# Patient Record
Sex: FEMALE | Race: WHITE | NOT HISPANIC OR LATINO | Employment: OTHER | URBAN - METROPOLITAN AREA
[De-identification: names, ages, dates, MRNs, and addresses within clinical notes are randomized per-mention and may not be internally consistent; named-entity substitution may affect disease eponyms.]

---

## 2023-01-01 ENCOUNTER — HOSPITAL ENCOUNTER (EMERGENCY)
Facility: HOSPITAL | Age: 83
End: 2023-06-04
Attending: EMERGENCY MEDICINE

## 2023-01-01 VITALS
DIASTOLIC BLOOD PRESSURE: 74 MMHG | SYSTOLIC BLOOD PRESSURE: 116 MMHG | OXYGEN SATURATION: 32 % | TEMPERATURE: 102 F | RESPIRATION RATE: 20 BRPM | WEIGHT: 140 LBS

## 2023-01-01 DIAGNOSIS — I46.9 CARDIAC ARREST: ICD-10-CM

## 2023-01-01 DIAGNOSIS — R41.89 UNRESPONSIVE: ICD-10-CM

## 2023-01-01 DIAGNOSIS — S06.334A: Primary | ICD-10-CM

## 2023-01-01 DIAGNOSIS — I21.3 ST ELEVATION MYOCARDIAL INFARCTION (STEMI), UNSPECIFIED ARTERY: ICD-10-CM

## 2023-01-01 LAB
ALBUMIN SERPL BCP-MCNC: 3.5 G/DL (ref 3.5–5.2)
ALP SERPL-CCNC: 113 U/L (ref 55–135)
ALT SERPL W/O P-5'-P-CCNC: 34 U/L (ref 10–44)
ANION GAP SERPL CALC-SCNC: 18 MMOL/L (ref 8–16)
AST SERPL-CCNC: 157 U/L (ref 10–40)
BACTERIA #/AREA URNS AUTO: ABNORMAL /HPF
BASOPHILS # BLD AUTO: 0.11 K/UL (ref 0–0.2)
BASOPHILS NFR BLD: 0.5 % (ref 0–1.9)
BILIRUB SERPL-MCNC: 1.1 MG/DL (ref 0.1–1)
BILIRUB UR QL STRIP: NEGATIVE
BUN SERPL-MCNC: 20 MG/DL (ref 8–23)
BUN SERPL-MCNC: 22 MG/DL (ref 6–30)
CALCIUM SERPL-MCNC: 9.4 MG/DL (ref 8.7–10.5)
CAOX CRY UR QL COMP ASSIST: ABNORMAL
CHLORIDE SERPL-SCNC: 105 MMOL/L (ref 95–110)
CHLORIDE SERPL-SCNC: 107 MMOL/L (ref 95–110)
CLARITY UR REFRACT.AUTO: ABNORMAL
CO2 SERPL-SCNC: 19 MMOL/L (ref 23–29)
COLOR UR AUTO: ABNORMAL
CREAT SERPL-MCNC: 1.2 MG/DL (ref 0.5–1.4)
CREAT SERPL-MCNC: 1.3 MG/DL (ref 0.5–1.4)
DIFFERENTIAL METHOD: ABNORMAL
EOSINOPHIL # BLD AUTO: 0 K/UL (ref 0–0.5)
EOSINOPHIL NFR BLD: 0 % (ref 0–8)
ERYTHROCYTE [DISTWIDTH] IN BLOOD BY AUTOMATED COUNT: 17.5 % (ref 11.5–14.5)
EST. GFR  (NO RACE VARIABLE): 40.3 ML/MIN/1.73 M^2
GLUCOSE SERPL-MCNC: 154 MG/DL (ref 70–110)
GLUCOSE SERPL-MCNC: 156 MG/DL (ref 70–110)
GLUCOSE UR QL STRIP: ABNORMAL
HCT VFR BLD AUTO: 52.5 % (ref 37–48.5)
HCT VFR BLD CALC: 55 %PCV (ref 36–54)
HGB BLD-MCNC: 16.8 G/DL (ref 12–16)
HGB UR QL STRIP: ABNORMAL
HYALINE CASTS UR QL AUTO: 7 /LPF
IMM GRANULOCYTES # BLD AUTO: 0.13 K/UL (ref 0–0.04)
IMM GRANULOCYTES NFR BLD AUTO: 0.6 % (ref 0–0.5)
KETONES UR QL STRIP: ABNORMAL
LACTATE SERPL-SCNC: 5 MMOL/L (ref 0.5–2.2)
LEUKOCYTE ESTERASE UR QL STRIP: NEGATIVE
LYMPHOCYTES # BLD AUTO: 1 K/UL (ref 1–4.8)
LYMPHOCYTES NFR BLD: 4.9 % (ref 18–48)
MAGNESIUM SERPL-MCNC: 2.2 MG/DL (ref 1.6–2.6)
MCH RBC QN AUTO: 33.4 PG (ref 27–31)
MCHC RBC AUTO-ENTMCNC: 32 G/DL (ref 32–36)
MCV RBC AUTO: 104 FL (ref 82–98)
MICROSCOPIC COMMENT: ABNORMAL
MONOCYTES # BLD AUTO: 1.1 K/UL (ref 0.3–1)
MONOCYTES NFR BLD: 5.2 % (ref 4–15)
NEUTROPHILS # BLD AUTO: 18.5 K/UL (ref 1.8–7.7)
NEUTROPHILS NFR BLD: 88.8 % (ref 38–73)
NITRITE UR QL STRIP: NEGATIVE
NRBC BLD-RTO: 0 /100 WBC
PH UR STRIP: 5 [PH] (ref 5–8)
PLATELET # BLD AUTO: 510 K/UL (ref 150–450)
PMV BLD AUTO: 9.2 FL (ref 9.2–12.9)
POC IONIZED CALCIUM: 0.98 MMOL/L (ref 1.06–1.42)
POC TCO2 (MEASURED): 22 MMOL/L (ref 23–29)
POTASSIUM BLD-SCNC: 4.5 MMOL/L (ref 3.5–5.1)
POTASSIUM SERPL-SCNC: 5 MMOL/L (ref 3.5–5.1)
PROT SERPL-MCNC: 7.4 G/DL (ref 6–8.4)
PROT UR QL STRIP: ABNORMAL
RBC # BLD AUTO: 5.03 M/UL (ref 4–5.4)
RBC #/AREA URNS AUTO: 7 /HPF (ref 0–4)
SAMPLE: ABNORMAL
SODIUM BLD-SCNC: 141 MMOL/L (ref 136–145)
SODIUM SERPL-SCNC: 142 MMOL/L (ref 136–145)
SP GR UR STRIP: 1.02 (ref 1–1.03)
SQUAMOUS #/AREA URNS AUTO: 2 /HPF
TROPONIN I SERPL DL<=0.01 NG/ML-MCNC: >50 NG/ML (ref 0–0.03)
URN SPEC COLLECT METH UR: ABNORMAL
WBC # BLD AUTO: 20.8 K/UL (ref 3.9–12.7)
WBC #/AREA URNS AUTO: 12 /HPF (ref 0–5)

## 2023-01-01 PROCEDURE — 96374 THER/PROPH/DIAG INJ IV PUSH: CPT

## 2023-01-01 PROCEDURE — 87040 BLOOD CULTURE FOR BACTERIA: CPT | Mod: 59 | Performed by: STUDENT IN AN ORGANIZED HEALTH CARE EDUCATION/TRAINING PROGRAM

## 2023-01-01 PROCEDURE — 99285 EMERGENCY DEPT VISIT HI MDM: CPT | Mod: ,,, | Performed by: NURSE PRACTITIONER

## 2023-01-01 PROCEDURE — 96376 TX/PRO/DX INJ SAME DRUG ADON: CPT

## 2023-01-01 PROCEDURE — 99291 CRITICAL CARE FIRST HOUR: CPT | Mod: ,,, | Performed by: EMERGENCY MEDICINE

## 2023-01-01 PROCEDURE — 63600175 PHARM REV CODE 636 W HCPCS

## 2023-01-01 PROCEDURE — 27200966 HC CLOSED SUCTION SYSTEM

## 2023-01-01 PROCEDURE — 99900035 HC TECH TIME PER 15 MIN (STAT)

## 2023-01-01 PROCEDURE — 99285 EMERGENCY DEPT VISIT HI MDM: CPT | Mod: 25

## 2023-01-01 PROCEDURE — 87086 URINE CULTURE/COLONY COUNT: CPT | Performed by: STUDENT IN AN ORGANIZED HEALTH CARE EDUCATION/TRAINING PROGRAM

## 2023-01-01 PROCEDURE — 85025 COMPLETE CBC W/AUTO DIFF WBC: CPT | Performed by: STUDENT IN AN ORGANIZED HEALTH CARE EDUCATION/TRAINING PROGRAM

## 2023-01-01 PROCEDURE — 83605 ASSAY OF LACTIC ACID: CPT | Performed by: STUDENT IN AN ORGANIZED HEALTH CARE EDUCATION/TRAINING PROGRAM

## 2023-01-01 PROCEDURE — 94002 VENT MGMT INPAT INIT DAY: CPT

## 2023-01-01 PROCEDURE — 63600175 PHARM REV CODE 636 W HCPCS: Performed by: EMERGENCY MEDICINE

## 2023-01-01 PROCEDURE — 84484 ASSAY OF TROPONIN QUANT: CPT | Performed by: STUDENT IN AN ORGANIZED HEALTH CARE EDUCATION/TRAINING PROGRAM

## 2023-01-01 PROCEDURE — 63600175 PHARM REV CODE 636 W HCPCS: Performed by: STUDENT IN AN ORGANIZED HEALTH CARE EDUCATION/TRAINING PROGRAM

## 2023-01-01 PROCEDURE — 94761 N-INVAS EAR/PLS OXIMETRY MLT: CPT

## 2023-01-01 PROCEDURE — 99291 PR CRITICAL CARE, E/M 30-74 MINUTES: ICD-10-PCS | Mod: ,,, | Performed by: EMERGENCY MEDICINE

## 2023-01-01 PROCEDURE — 27000221 HC OXYGEN, UP TO 24 HOURS

## 2023-01-01 PROCEDURE — 96375 TX/PRO/DX INJ NEW DRUG ADDON: CPT

## 2023-01-01 PROCEDURE — 83735 ASSAY OF MAGNESIUM: CPT | Performed by: STUDENT IN AN ORGANIZED HEALTH CARE EDUCATION/TRAINING PROGRAM

## 2023-01-01 PROCEDURE — 81001 URINALYSIS AUTO W/SCOPE: CPT | Performed by: STUDENT IN AN ORGANIZED HEALTH CARE EDUCATION/TRAINING PROGRAM

## 2023-01-01 PROCEDURE — 99900026 HC AIRWAY MAINTENANCE (STAT)

## 2023-01-01 PROCEDURE — 99285 PR EMERGENCY DEPT VISIT,LEVEL V: ICD-10-PCS | Mod: ,,, | Performed by: NURSE PRACTITIONER

## 2023-01-01 PROCEDURE — 80053 COMPREHEN METABOLIC PANEL: CPT | Performed by: STUDENT IN AN ORGANIZED HEALTH CARE EDUCATION/TRAINING PROGRAM

## 2023-01-01 RX ORDER — SERTRALINE HYDROCHLORIDE 50 MG/1
50 TABLET, FILM COATED ORAL DAILY
COMMUNITY

## 2023-01-01 RX ORDER — MORPHINE SULFATE 4 MG/ML
4 INJECTION, SOLUTION INTRAMUSCULAR; INTRAVENOUS
Status: DISCONTINUED | OUTPATIENT
Start: 2023-01-01 | End: 2023-06-05 | Stop reason: HOSPADM

## 2023-01-01 RX ORDER — SIMVASTATIN 40 MG/1
40 TABLET, FILM COATED ORAL NIGHTLY
COMMUNITY

## 2023-01-01 RX ORDER — DONEPEZIL HYDROCHLORIDE 10 MG/1
10 TABLET, FILM COATED ORAL NIGHTLY
COMMUNITY

## 2023-01-01 RX ORDER — LORAZEPAM 2 MG/ML
0.5 INJECTION INTRAMUSCULAR EVERY 30 MIN PRN
Status: DISCONTINUED | OUTPATIENT
Start: 2023-01-01 | End: 2023-06-05 | Stop reason: HOSPADM

## 2023-01-01 RX ORDER — MORPHINE SULFATE 4 MG/ML
INJECTION, SOLUTION INTRAMUSCULAR; INTRAVENOUS
Status: COMPLETED
Start: 2023-01-01 | End: 2023-01-01

## 2023-01-01 RX ORDER — ACETAMINOPHEN 650 MG/1
650 SUPPOSITORY RECTAL
Status: DISCONTINUED | OUTPATIENT
Start: 2023-01-01 | End: 2023-01-01

## 2023-01-01 RX ORDER — FAMOTIDINE 20 MG/1
20 TABLET, FILM COATED ORAL 2 TIMES DAILY
COMMUNITY

## 2023-01-01 RX ORDER — ASPIRIN 81 MG/1
81 TABLET ORAL DAILY
COMMUNITY

## 2023-01-01 RX ORDER — PANTOPRAZOLE SODIUM 40 MG/1
40 TABLET, DELAYED RELEASE ORAL DAILY
COMMUNITY

## 2023-01-01 RX ORDER — LORAZEPAM 2 MG/ML
INJECTION INTRAMUSCULAR
Status: COMPLETED
Start: 2023-01-01 | End: 2023-01-01

## 2023-01-01 RX ORDER — HYDROXYUREA 500 MG/1
500 CAPSULE ORAL DAILY
COMMUNITY

## 2023-01-01 RX ORDER — LOSARTAN POTASSIUM 25 MG/1
25 TABLET ORAL DAILY
COMMUNITY

## 2023-01-01 RX ORDER — ALLOPURINOL 100 MG/1
100 TABLET ORAL DAILY
COMMUNITY

## 2023-01-01 RX ORDER — TRAZODONE HYDROCHLORIDE 50 MG/1
50 TABLET ORAL NIGHTLY
COMMUNITY

## 2023-01-01 RX ORDER — CETIRIZINE HYDROCHLORIDE 10 MG/1
10 TABLET ORAL DAILY
COMMUNITY

## 2023-01-01 RX ORDER — LORAZEPAM 2 MG/ML
1 INJECTION INTRAMUSCULAR
Status: COMPLETED | OUTPATIENT
Start: 2023-01-01 | End: 2023-01-01

## 2023-01-01 RX ORDER — DOXYCYCLINE 100 MG/1
100 CAPSULE ORAL EVERY 12 HOURS
COMMUNITY

## 2023-01-01 RX ADMIN — LORAZEPAM 0.5 MG: 2 INJECTION INTRAMUSCULAR; INTRAVENOUS at 03:06

## 2023-01-01 RX ADMIN — MORPHINE SULFATE 4 MG: 4 INJECTION INTRAVENOUS at 03:06

## 2023-01-01 RX ADMIN — LORAZEPAM 0.5 MG: 2 INJECTION INTRAMUSCULAR; INTRAVENOUS at 01:06

## 2023-01-01 RX ADMIN — MORPHINE SULFATE 4 MG: 4 INJECTION INTRAVENOUS at 01:06

## 2023-01-01 RX ADMIN — LORAZEPAM 1 MG: 2 INJECTION INTRAMUSCULAR; INTRAVENOUS at 03:06

## 2023-06-04 PROBLEM — S06.33AA INTRAPARENCHYMAL HEMATOMA OF BRAIN: Status: ACTIVE | Noted: 2023-01-01

## 2023-06-04 NOTE — CONSULTS
[facility] - Emergency Dept  Neurocritical Care  Consult Note    Admit Date: 6/4/2023  Service Date: 06/04/2023  Length of Stay: 0    Consults  Subjective:     Chief Complaint: <principal problem not specified>    History of Present Illness: No notes on file    Past Medical History:   Diagnosis Date    Cholelithiasis     Chronic pain     Cognitive decline     Depression     Diverticulitis     Fibromyalgia     Hypertension     Memory deficit     Mixed hyperlipidemia     Thrombocytopenia, unspecified     Urinary tract infection, site not specified      No past surgical history on file.   No current facility-administered medications on file prior to encounter.     Current Outpatient Medications on File Prior to Encounter   Medication Sig Dispense Refill    allopurinoL (ZYLOPRIM) 100 MG tablet Take 100 mg by mouth once daily.      aspirin (ECOTRIN) 81 MG EC tablet Take 81 mg by mouth once daily.      cetirizine (ZYRTEC) 10 MG tablet Take 10 mg by mouth once daily.      donepeziL (ARICEPT) 10 MG tablet Take 10 mg by mouth every evening.      doxycycline (VIBRAMYCIN) 100 MG Cap Take 100 mg by mouth every 12 (twelve) hours.      famotidine (PEPCID) 20 MG tablet Take 20 mg by mouth 2 (two) times daily.      folic acid/multivit-min/lutein (CENTRUM SILVER ORAL) Take by mouth.      hydroxyurea (HYDREA) 500 mg Cap Take 500 mg by mouth once daily.      losartan (COZAAR) 25 MG tablet Take 25 mg by mouth once daily.      pantoprazole (PROTONIX) 40 MG tablet Take 40 mg by mouth once daily.      sertraline (ZOLOFT) 50 MG tablet Take 50 mg by mouth once daily.      simvastatin (ZOCOR) 40 MG tablet Take 40 mg by mouth every evening.      traZODone (DESYREL) 50 MG tablet Take 50 mg by mouth every evening.        Allergies: Ciprofloxacin, Codeine, and Lyrica [pregabalin]    No family history on file.     Review of Systems   Reason unable to perform ROS: decrease LOC.   Objective:     Vitals:    Temp: (!) 102.4 °F  (39.1 °C)  Pulse: (!) 0  BP: 116/74  MAP (mmHg): 90  Resp: 20  SpO2: (!) 32 %  Oxygen Concentration (%): 80  Vent Mode: A/C  Set Rate: 16 BPM  Vt Set: 380 mL  PEEP/CPAP: 5 cmH20  Peak Airway Pressure: 22 cmH20  Mean Airway Pressure: 8.3 cmH20  Plateau Pressure: 0 cmH20    Temp  Min: 98.8 °F (37.1 °C)  Max: 102.4 °F (39.1 °C)  Pulse  Min: 0  Max: 134  BP  Min: 103/65  Max: 128/74  MAP (mmHg)  Min: 81  Max: 95  Resp  Min: 10  Max: 30  SpO2  Min: 32 %  Max: 100 %  Oxygen Concentration (%)  Min: 80  Max: 80    No intake/output data recorded.            Physical Exam  Vitals and nursing note reviewed.   Constitutional:       Appearance: She is ill-appearing.   HENT:      Head: Normocephalic.      Nose: Nose normal.      Mouth/Throat:      Mouth: Mucous membranes are moist.      Pharynx: Oropharynx is clear.   Cardiovascular:      Rate and Rhythm: Normal rate and regular rhythm.      Pulses: Normal pulses.      Heart sounds: Normal heart sounds.   Pulmonary:      Effort: Pulmonary effort is normal.      Breath sounds: Normal breath sounds.   Skin:     General: Skin is warm and dry.      Capillary Refill: Capillary refill takes 2 to 3 seconds.   Neurological:      Comments: GCS T3  Non-reactive pupils  No cough or gag  Extensor posture BL UE  Triple flexion BLE         Unable to test orientation, language, memory, judgment, insight, fund of knowledge, hearing, shoulder shrug, tongue protrusion, coordination, gait due to level of consciousness.       Today I personally reviewed pertinent medications, lines/drains/airways, imaging, cardiology results, laboratory results, microbiology results, notably:          Assessment/Plan:     Neuro  Intraparenchymal hematoma of brain  Large IPH with IVH, NSGY evaluation not surgical interventions offered. Family has decided to transition to comfort care due to not a meaningful recovery.             The patient is being Prophylaxed for:  Venous Thromboembolism with: Not Applicable    Stress Ulcer with: Not Applicable   Ventilator Pneumonia with: not applicable    Activity Orders          None        No Order  Level 3  Tosin Rudolph NP  Neurocritical Care  [facility] - Emergency Dept

## 2023-06-04 NOTE — SUBJECTIVE & OBJECTIVE
Past Medical History:   Diagnosis Date    Cholelithiasis     Chronic pain     Cognitive decline     Depression     Diverticulitis     Fibromyalgia     Hypertension     Memory deficit     Mixed hyperlipidemia     Thrombocytopenia, unspecified     Urinary tract infection, site not specified      No past surgical history on file.   No current facility-administered medications on file prior to encounter.     Current Outpatient Medications on File Prior to Encounter   Medication Sig Dispense Refill    allopurinoL (ZYLOPRIM) 100 MG tablet Take 100 mg by mouth once daily.      aspirin (ECOTRIN) 81 MG EC tablet Take 81 mg by mouth once daily.      cetirizine (ZYRTEC) 10 MG tablet Take 10 mg by mouth once daily.      donepeziL (ARICEPT) 10 MG tablet Take 10 mg by mouth every evening.      doxycycline (VIBRAMYCIN) 100 MG Cap Take 100 mg by mouth every 12 (twelve) hours.      famotidine (PEPCID) 20 MG tablet Take 20 mg by mouth 2 (two) times daily.      folic acid/multivit-min/lutein (CENTRUM SILVER ORAL) Take by mouth.      hydroxyurea (HYDREA) 500 mg Cap Take 500 mg by mouth once daily.      losartan (COZAAR) 25 MG tablet Take 25 mg by mouth once daily.      pantoprazole (PROTONIX) 40 MG tablet Take 40 mg by mouth once daily.      sertraline (ZOLOFT) 50 MG tablet Take 50 mg by mouth once daily.      simvastatin (ZOCOR) 40 MG tablet Take 40 mg by mouth every evening.      traZODone (DESYREL) 50 MG tablet Take 50 mg by mouth every evening.        Allergies: Ciprofloxacin, Codeine, and Lyrica [pregabalin]    No family history on file.     Review of Systems   Reason unable to perform ROS: decrease LOC.   Objective:     Vitals:    Temp: (!) 102.4 °F (39.1 °C)  Pulse: (!) 0  BP: 116/74  MAP (mmHg): 90  Resp: 20  SpO2: (!) 32 %  Oxygen Concentration (%): 80  Vent Mode: A/C  Set Rate: 16 BPM  Vt Set: 380 mL  PEEP/CPAP: 5 cmH20  Peak Airway Pressure: 22 cmH20  Mean Airway Pressure: 8.3 cmH20  Plateau Pressure: 0 cmH20    Temp  Min:  98.8 °F (37.1 °C)  Max: 102.4 °F (39.1 °C)  Pulse  Min: 0  Max: 134  BP  Min: 103/65  Max: 128/74  MAP (mmHg)  Min: 81  Max: 95  Resp  Min: 10  Max: 30  SpO2  Min: 32 %  Max: 100 %  Oxygen Concentration (%)  Min: 80  Max: 80    No intake/output data recorded.            Physical Exam  Vitals and nursing note reviewed.   Constitutional:       Appearance: She is ill-appearing.   HENT:      Head: Normocephalic.      Nose: Nose normal.      Mouth/Throat:      Mouth: Mucous membranes are moist.      Pharynx: Oropharynx is clear.   Cardiovascular:      Rate and Rhythm: Normal rate and regular rhythm.      Pulses: Normal pulses.      Heart sounds: Normal heart sounds.   Pulmonary:      Effort: Pulmonary effort is normal.      Breath sounds: Normal breath sounds.   Skin:     General: Skin is warm and dry.      Capillary Refill: Capillary refill takes 2 to 3 seconds.   Neurological:      Comments: GCS T3  Non-reactive pupils  No cough or gag  Extensor posture BL UE  Triple flexion BLE         Unable to test orientation, language, memory, judgment, insight, fund of knowledge, hearing, shoulder shrug, tongue protrusion, coordination, gait due to level of consciousness.       Today I personally reviewed pertinent medications, lines/drains/airways, imaging, cardiology results, laboratory results, microbiology results, notably:

## 2023-06-04 NOTE — PLAN OF CARE
06/04/23 1234   Post-Acute Status   Post-Acute Authorization Other   Discharge Delays None known at this time   Discharge Plan   Discharge Plan A Other  (await recs from cards, critical care and NSGY)

## 2023-06-04 NOTE — ASSESSMENT & PLAN NOTE
83 F with IPH with IVH 3T on arrival ICH score 5  With grim prognosis and family requesting palliative care.     No surgical  interventions.

## 2023-06-04 NOTE — PROVIDER PROGRESS NOTES - EMERGENCY DEPT.
Encounter Date: 6/4/2023    ED Physician Progress Notes        Physician Note:   3:00 PM  Patient received in sign-out from outgoing team.  Briefly, 83-year-old female transferred from [facility] for septic shock, large intraparenchymal hemorrhage with herniation requiring intubation, STEMI.      Son initially at bedside, patient was seen by all consultants who have agreed that her prognosis is terminal.  Patient has been terminally extubated at 1:45 p.m.    On re-evaluation, patient unresponsive with sonorous breathing.  Pupils are unequal.  Saturation 67%, tachycardic.  Family not present at bedside.  P.r.n. morphine and Ativan have been ordered.     Continue to monitor.     Pt re-evaluated, apneic with no pulse.  Asystole on monitor.  Time of death 4:52 PM  Notified feroz Webb by phone.    SARATH Castillo MD  Staff ED Physician  06/04/2023 4:56 PM

## 2023-06-04 NOTE — ED NOTES
I-STAT Chem-8+ Results:   Value Reference Range   Sodium 141 136-145 mmol/L   Potassium  4.5 3.5-5.1 mmol/L   Chloride 107  mmol/L   Ionized Calcium 0.98 1.06-1.42 mmol/L   CO2 (measured) 22 23-29 mmol/L   Glucose 154  mg/dL   BUN 22 6-30 mg/dL   Creatinine 1.2 0.5-1.4 mg/dL   Hematocrit 55 36-54%

## 2023-06-04 NOTE — ED PROVIDER NOTES
Encounter Date: 6/4/2023       History     Chief Complaint   Patient presents with    [facility] Transfer     Seen at [facility] yesterday post fall and diagnosed with uti; no ct done. Found unresponsive this am. Lnk 10pm. Arrived at [facility] with gcs 5. Given etomidate and pippa for intubation. 7.0 tube @ 22. 14 fr og. 16 fr Treadwell. Flight care started versed drip en route to er. Gave total of 100mcg Fentanyl. + gag. + bilateral eye movement (rhythmic side to side movement). Right pupil larger than left. Versed drip stopped upon arrival to [facility] er.      83-year-old female presenting to the ED as transfer from outside hospital for intraparenchymal hemorrhage.  This is seen at [facility] in [place] for yesterday diagnosed with the UTI.  Was also reported to have multiple falls.  Was seen at [facility] today, and was diagnosed with a new intraparenchymal hemorrhage.  Also seen to have a STEMI.  Patient was intubated.  GCS was five on arrival.  Flight medicine started patient on Versed drip for sedation.  Was transferred to [facility] for Neurosurgery, neuro ICU evaluation, possible TEU.    The history is provided by the EMS personnel.   Review of patient's allergies indicates:   Allergen Reactions    Ciprofloxacin     Codeine     Lyrica [pregabalin]      Past Medical History:   Diagnosis Date    Cholelithiasis     Chronic pain     Cognitive decline     Depression     Diverticulitis     Fibromyalgia     Hypertension     Memory deficit     Mixed hyperlipidemia     Thrombocytopenia, unspecified     Urinary tract infection, site not specified      No past surgical history on file.  No family history on file.     Review of Systems    Physical Exam     Initial Vitals [06/04/23 1017]   BP Pulse Resp Temp SpO2   128/65 102 16 98.8 °F (37.1 °C) 100 %      MAP       --         Physical Exam    Nursing note and vitals reviewed.  Constitutional:   intubated   Eyes:   Unequal pupils    Cardiovascular:            tachycardic   Pulmonary/Chest: She has no wheezes. She has no rales.   Bilateral breath sounds heard   Abdominal: Abdomen is soft.     Neurological:   No purposeful movement in upper lower extremities, no withdrawing from pain  Positive gag, cough       ED Course   Procedures  Labs Reviewed   CBC W/ AUTO DIFFERENTIAL - Abnormal; Notable for the following components:       Result Value    WBC 20.80 (*)     Hemoglobin 16.8 (*)     Hematocrit 52.5 (*)      (*)     MCH 33.4 (*)     RDW 17.5 (*)     Platelets 510 (*)     Immature Granulocytes 0.6 (*)     Gran # (ANC) 18.5 (*)     Immature Grans (Abs) 0.13 (*)     Mono # 1.1 (*)     Gran % 88.8 (*)     Lymph % 4.9 (*)     All other components within normal limits   COMPREHENSIVE METABOLIC PANEL - Abnormal; Notable for the following components:    CO2 19 (*)     Glucose 156 (*)     Total Bilirubin 1.1 (*)      (*)     Anion Gap 18 (*)     eGFR 40.3 (*)     All other components within normal limits   TROPONIN I - Abnormal; Notable for the following components:    Troponin I >50.000 (*)     All other components within normal limits   URINALYSIS, REFLEX TO URINE CULTURE - Abnormal; Notable for the following components:    Appearance, UA Cloudy (*)     Protein, UA 3+ (*)     Glucose, UA 2+ (*)     Ketones, UA Trace (*)     Occult Blood UA 2+ (*)     All other components within normal limits    Narrative:     Specimen Source->Urine   LACTIC ACID, PLASMA - Abnormal; Notable for the following components:    Lactate (Lactic Acid) 5.0 (*)     All other components within normal limits   URINALYSIS MICROSCOPIC - Abnormal; Notable for the following components:    RBC, UA 7 (*)     WBC, UA 12 (*)     Hyaline Casts, UA 7 (*)     All other components within normal limits    Narrative:     Specimen Source->Urine   ISTAT PROCEDURE - Abnormal; Notable for the following components:    POC Glucose 154 (*)     POC TCO2 (MEASURED) 22 (*)     POC  Ionized Calcium 0.98 (*)     POC Hematocrit 55 (*)     All other components within normal limits   CULTURE, BLOOD   CULTURE, BLOOD   CULTURE, URINE   MAGNESIUM     EKG Readings: (Independently Interpreted)   ST-elevation noted in anterior leads.  Concern for STEMI.  Rate of 110.     Imaging Results              CT Head Without Contrast (Final result)  Result time 06/04/23 12:48:15      Final result by Tom Gooden DO (06/04/23 12:48:15)                   Impression:      Large parenchymal hemorrhage centered in the right basal ganglia with intraventricular extension similar to prior report allowing for limitation by lack of prior imaging for comparison.    Mass effect with approximately 1.2 cm of leftward midline shift with subfalcine herniation    Distension of the lateral ventricles left greater than right concerning for evolving hydrocephalus    Clinical correlation and emergent neuro surgical evaluation recommended.  Please see above for additional details.    Further evaluation with MRI as warranted if patient compatible.      Electronically signed by: Tom Gooden DO  Date:    06/04/2023  Time:    12:48               Narrative:    EXAMINATION:  CT HEAD WITHOUT CONTRAST    CLINICAL HISTORY:  Subdural hemorrhage;    TECHNIQUE:  Multiple sequential 5 mm axial images of the head without contrast.  Coronal and sagittal reformatted imaging from the axial acquisition.    COMPARISON:  Outside institution report [facility] earlier today 06/04/2023    FINDINGS:  Large hyperdense collection centered in the right basal ganglia compatible with acute parenchymal hemorrhage.  This measures approximately 7.8 x 5.2 x 5.3 cm in AP by TV by CC dimensions respectively.  There is intraventricular extension of hemorrhage into the right lateral ventricle.  Mass effect with compression of the right lateral ventricle and approximate 1.2 cm of leftward midline shift with subfalcine herniation.    There is  hypoattenuation within the parenchyma about the hemorrhage compatible with edema.  There is hypoattenuation within the right thalamus and questionably hypoattenuation within the right midbrain and questionably diffusely within the demetria.    Scattered intraventricular hemorrhage with distension of the left lateral ventricle and right temporal horn compatible with hydrocephalus    Hypoattenuation within the cerebral white matter concerning for possible superimposed chronic ischemic change    There is partial crowding of the cerebral sulci at the vertex.    Small hypodensity left posterior thalamus most compatible with remote lacunar-type infarction.    COMMUNICATION  This critical result was discovered/received at 12:37.  The critical information above was relayed directly by me by telephone to Dr. Garsia on 06/04/2023 at 12:39.                                       X-Ray Chest AP Portable (Final result)  Result time 06/04/23 11:06:52      Final result by Palomo Smith MD (06/04/23 11:06:52)                   Impression:      As above.      Electronically signed by: Palomo Smith MD  Date:    06/04/2023  Time:    11:06               Narrative:    EXAMINATION:  XR CHEST AP PORTABLE    CLINICAL HISTORY:  Other symptoms and signs involving cognitive functions and awareness    TECHNIQUE:  Single frontal view of the chest was performed.    FINDINGS:  There is an endotracheal tube with its tip at the aortic arch.  There is a nasogastric tube with its tip inferior to the gastroesophageal junction but out of the field of view.  There are bilateral perihilar opacities as well as a left basilar opacity concerning for infection cannot exclude edema.  There is no pneumothorax or pleural fluid.  The cardiac silhouette is not enlarged.  The aorta is tortuous.  The osseous structures demonstrate degenerative change.                                    X-Rays:   Independently Interpreted Readings:   Other Readings:  ET tube in  appropriate position  Medications   LORazepam injection 1 mg (1 mg Intravenous Given 6/4/23 7117)     Medical Decision Making:   History:   Old Medical Records: I decided to obtain old medical records.  Initial Assessment:   Emergent evaluation 83-year-old female presenting to the as a transfer from outside hospital as a NAMRATA candidate for neurosurgical evaluation secondary to large intracranial hemorrhage.  GCS of 4 prior to intubation per notes.    Differential includes was not limited to intracranial hemorrhage, metastasis, mass effect, herniation.    Initial EKG showed concern for STEMI.  Discussed with cardiology resident who recommended no acute intervention due to patient's poor prognosis.  Obtain blood work.  Initially ordered antibiotics and Tylenol due to concern for sepsis, though after family came, patient transitioned to comfort care, allow natural death.  Patient elderly extubated.  Given Ativan and morphine for comfort.  Pending further management, possible transfer to hospice, patient handed off to oncoming team at shift change.  Independently Interpreted Test(s):   I have ordered and independently interpreted X-rays - see prior notes.  I have ordered and independently interpreted EKG Reading(s) - see prior notes  Clinical Tests:   Lab Tests: Ordered and Reviewed  Radiological Study: Ordered and Reviewed  Medical Tests: Ordered and Reviewed  Other:   I have discussed this case with another health care provider.       <> Summary of the Discussion: Neurosurgery, neuro ICU           ED Course as of 06/05/23 1841   Sun Jun 04, 2023   1059 Pt with signs of a STEMI on her EKG with [facility] Pt  with a large intracranial hemorrhage with significant neuro deficits. Is a TEU candidate. I don't think patient is a candidate for a cardiac cath at this time  [GK]   1117 Discussed concern for STEMI with Cardiology.  They said due to patient's mental  status, prognosis, she is not someone that they would take the cath lab at this moment.  They will drop a plan of care note. [AU]   1254 Pt's son Jon is at bedside.  We discussed pt's condition and he reports that patient would want to die a natural death and she was ready to go be with his father.  We will discuss with neurosurgery and neuro ICU. Nurses updated.  [GK]      ED Course User Index  [AU] Marek Jay MD  [GK] Kaleigh Garsia MD                 Clinical Impression:   Final diagnoses:  [R41.89] Unresponsive  [S06.334A] Intraparenchymal hematoma of brain with loss of consciousness of 6 hours to 24 hours, unspecified laterality, initial encounter (Primary)  [I21.3] ST elevation myocardial infarction (STEMI), unspecified artery  [I46.9] Cardiac arrest        ED Disposition Condition                     Marek Jay MD  Resident  [phone]

## 2023-06-04 NOTE — ED NOTES
Patient continues to have spontaneous snoring respirations with periods of apnea. Peripheral pulse remains palpable. HR on monitor 108. Sat 58%. Skin color appears cyanotic.

## 2023-06-04 NOTE — SUBJECTIVE & OBJECTIVE
(Not in a hospital admission)      Review of patient's allergies indicates:   Allergen Reactions    Ciprofloxacin     Codeine     Lyrica [pregabalin]        Past Medical History:   Diagnosis Date    Cholelithiasis     Chronic pain     Cognitive decline     Depression     Diverticulitis     Fibromyalgia     Hypertension     Memory deficit     Mixed hyperlipidemia     Thrombocytopenia, unspecified     Urinary tract infection, site not specified      No past surgical history on file.  Family History    None       Tobacco Use    Smoking status: Not on file    Smokeless tobacco: Not on file   Substance and Sexual Activity    Alcohol use: Not on file    Drug use: Not on file    Sexual activity: Not on file     Review of Systems  Objective:     Weight: 63.5 kg (140 lb)  There is no height or weight on file to calculate BMI.  Vital Signs (Most Recent):  Temp: (!) 102.4 °F (39.1 °C) (06/04/23 1236)  Pulse: (!) 134 (06/04/23 1409)  Resp: (!) 28 (06/04/23 1409)  BP: 116/74 (06/04/23 1258)  SpO2: (!) 64 % (06/04/23 1409) Vital Signs (24h Range):  Temp:  [98.8 °F (37.1 °C)-102.4 °F (39.1 °C)] 102.4 °F (39.1 °C)  Pulse:  [102-134] 134  Resp:  [10-30] 28  SpO2:  [63 %-100 %] 64 %  BP: (103-128)/(65-74) 116/74                Vent Mode: A/C  Oxygen Concentration (%):  [80] 80  Resp Rate Total:  [16 br/min-24 br/min] 22 br/min  Vt Set:  [380 mL] 380 mL  PEEP/CPAP:  [5 cmH20] 5 cmH20  Mean Airway Pressure:  [8.3 cmH20] 8.3 cmH20             Urethral Catheter 06/04/23 1015 (Active)          Physical Exam   Neurosurgery Physical Exam    3t no sedation + cough      Significant Labs:  Recent Labs   Lab 06/04/23  1113   *      K 5.0      CO2 19*   BUN 20   CREATININE 1.3   CALCIUM 9.4   MG 2.2     Recent Labs   Lab 06/04/23  0848 06/04/23  1113 06/04/23  1116   WBC 22.8*  22.8 20.80*  --    HGB 15.3 16.8*  --    HCT 49.4* 52.5* 55*   * 510*  --      Recent Labs   Lab 06/04/23  0809   INR 1.10   APTT <20.0*      Microbiology Results (last 7 days)       Procedure Component Value Units Date/Time    Urine culture [[id number]] Collected: 06/04/23 1119    Order Status: No result Specimen: Urine Updated: 06/04/23 1204    Blood culture #1 **CANNOT BE ORDERED STAT** [[id number]] Collected: 06/04/23 1120    Order Status: Sent Specimen: Blood from Peripheral, Forearm, Right Updated: 06/04/23 1124    Blood culture #2 **CANNOT BE ORDERED STAT** [[id number]] Collected: 06/04/23 1114    Order Status: Sent Specimen: Blood from Peripheral, Forearm, Right Updated: 06/04/23 1117          All pertinent labs from the last 24 hours have been reviewed.    Significant Diagnostics:  I have reviewed all pertinent imaging results/findings within the past 24 hours.

## 2023-06-04 NOTE — ED NOTES
CT Head Without Contrast  Order: [id number]  Impression    IMPRESSION:   1. Large right frontotemporal parietal parenchymal hematoma with   vasogenic edema resulting in mass effect and midline shift with   extensive intraventricular extension   2. Hydrocephalus     Referring Facility: [facility]  Chief Complaint: Found Unresponsive  Diagnosis: Large intracerebral hemorrhage on the right with bleeding into ventricles with mid-line shift  Pertinent Lab/ Imaging Results: As per line above  GCS ( for Neuro/ Trauma Patients): 4 Piror to intubation  Current Medication Infusions: Normal Saline  Interventions Needed on Arrival to ED: Call TEU TEAM  Service Line and Specialist Transfer Discussed With: Dr. Miller / Neurosurgery  Required Equipment: Cardiac Monitor, IV Access, Intubated, Soha pulse Ox, 14 Fr OG tube  Mode of Transport ( Air/Ground): Flight  Isolation Precautions: None  Notes: 0812 /79,  (Sinus Tachycardia per referring facility) Temp 100.3 rectal, Resp 16, 99% sats on FiO2 100%  , wt. 140 lbs.

## 2023-06-04 NOTE — PLAN OF CARE
[facility] - Emergency Dept  Discharge Final Note    Primary Care Provider: Primary Doctor No    Expected Discharge Date:     Final Discharge Note (most recent)       Final Note - 23 0684          Final Note    Assessment Type Final Discharge Note (P)      Anticipated Discharge Disposition  (P)         Post-Acute Status    Discharge Delays None known at this time (P)                      Important Message from Medicare

## 2023-06-04 NOTE — PLAN OF CARE
Below is current plan/impression per team.     IMPRESSION:   1. Large right frontotemporal parietal parenchymal hematoma with   vasogenic edema resulting in mass effect and midline shift with   extensive intraventricular extension   2. Hydrocephalus      Referring Facility: [facility]  Chief Complaint: Found Unresponsive  Diagnosis: Large intracerebral hemorrhage on the right with bleeding into ventricles with mid-line shift  Pertinent Lab/ Imaging Results: As per line above  GCS ( for Neuro/ Trauma Patients): 4 Piror to intubation    [facility] - [facility]  Initial Discharge Assessment       Primary Care Provider: No primary care provider on file.    Admission Diagnosis: ICH    Admission Date: 6/4/2023  Expected Discharge Date:          Payor: /     Extended Emergency Contact Information  Primary Emergency Contact: Jon Walden  Mobile Phone: [phone]  Relation: Son  Preferred language: English   needed? No    Discharge Plan A: (P) Other (await critical care, cards and NSGY to make rec's)       No Pharmacies Listed    Initial Assessment (most recent)       Adult Discharge Assessment - 06/04/23 1232          Discharge Assessment    Assessment Type Discharge Planning Assessment (P)      Confirmed/corrected address, phone number and insurance Yes (P)      Confirmed Demographics Correct on Facesheet (P)      Source of Information patient;health record (P)      If unable to respond/provide information was family/caregiver contacted? No Contact Information Available (P)      Does patient/caregiver understand observation status No (P)      Was observation education provided? No (P)      Communicated JOJO with patient/caregiver No (P)      Current cognitive status: Coma/Sedated/Intubated (P)      Discharge Plan A Other (P)    await critical care, cards and NSGY to make rec's

## 2023-06-04 NOTE — HPI
84 y/o F presenting ICH 5 3T with cough on arrival. Family expressing that they understand she will likely not survive and  that they do not want any interventions and want to pursue  a palliative route.

## 2023-06-04 NOTE — CONSULTS
[facility] - Emergency Dept  Neurosurgery  Consult Note    Inpatient consult to Neurosurgery  Consult performed by: Chema Casanova MD  Consult ordered by: Marek Jay MD        Subjective:     Chief Complaint/Reason for Admission:     History of Present Illness:  84 y/o F presenting ICH 5 3T with cough on arrival. Family expressing that they understand she will likely not survive and  that they do not want any interventions and want to pursue  a palliative route.         (Not in a hospital admission)      Review of patient's allergies indicates:   Allergen Reactions    Ciprofloxacin     Codeine     Lyrica [pregabalin]        Past Medical History:   Diagnosis Date    Cholelithiasis     Chronic pain     Cognitive decline     Depression     Diverticulitis     Fibromyalgia     Hypertension     Memory deficit     Mixed hyperlipidemia     Thrombocytopenia, unspecified     Urinary tract infection, site not specified      No past surgical history on file.  Family History    None       Tobacco Use    Smoking status: Not on file    Smokeless tobacco: Not on file   Substance and Sexual Activity    Alcohol use: Not on file    Drug use: Not on file    Sexual activity: Not on file     Review of Systems  Objective:     Weight: 63.5 kg (140 lb)  There is no height or weight on file to calculate BMI.  Vital Signs (Most Recent):  Temp: (!) 102.4 °F (39.1 °C) (06/04/23 1236)  Pulse: (!) 134 (06/04/23 1409)  Resp: (!) 28 (06/04/23 1409)  BP: 116/74 (06/04/23 1258)  SpO2: (!) 64 % (06/04/23 1409) Vital Signs (24h Range):  Temp:  [98.8 °F (37.1 °C)-102.4 °F (39.1 °C)] 102.4 °F (39.1 °C)  Pulse:  [102-134] 134  Resp:  [10-30] 28  SpO2:  [63 %-100 %] 64 %  BP: (103-128)/(65-74) 116/74                Vent Mode: A/C  Oxygen Concentration (%):  [80] 80  Resp Rate Total:  [16 br/min-24 br/min] 22 br/min  Vt Set:  [380 mL] 380 mL  PEEP/CPAP:  [5 cmH20] 5 cmH20  Mean Airway Pressure:  [8.3 cmH20] 8.3 cmH20              Urethral Catheter 06/04/23 1015 (Active)          Physical Exam   Neurosurgery Physical Exam    3t no sedation + cough      Significant Labs:  Recent Labs   Lab 06/04/23  1113   *      K 5.0      CO2 19*   BUN 20   CREATININE 1.3   CALCIUM 9.4   MG 2.2     Recent Labs   Lab 06/04/23  0848 06/04/23  1113 06/04/23  1116   WBC 22.8*  22.8 20.80*  --    HGB 15.3 16.8*  --    HCT 49.4* 52.5* 55*   * 510*  --      Recent Labs   Lab 06/04/23  0809   INR 1.10   APTT <20.0*     Microbiology Results (last 7 days)       Procedure Component Value Units Date/Time    Urine culture [[id number]] Collected: 06/04/23 1119    Order Status: No result Specimen: Urine Updated: 06/04/23 1204    Blood culture #1 **CANNOT BE ORDERED STAT** [[id number]] Collected: 06/04/23 1120    Order Status: Sent Specimen: Blood from Peripheral, Forearm, Right Updated: 06/04/23 1124    Blood culture #2 **CANNOT BE ORDERED STAT** [[id number]] Collected: 06/04/23 1114    Order Status: Sent Specimen: Blood from Peripheral, Forearm, Right Updated: 06/04/23 1117          All pertinent labs from the last 24 hours have been reviewed.    Significant Diagnostics:  I have reviewed all pertinent imaging results/findings within the past 24 hours.    Assessment/Plan:     Intraparenchymal hematoma of brain  83 F with IPH with IVH 3T on arrival ICH score 5  With grim prognosis and family requesting palliative care.     No surgical  interventions.          Thank you for your consult.     Chema Casanova MD  Neurosurgery  [facility]

## 2023-06-04 NOTE — ASSESSMENT & PLAN NOTE
Large IPH with IVH, NSGY evaluation not surgical interventions offered. Family has decided to transition to comfort care due to not a meaningful recovery.

## 2023-06-04 NOTE — CARE UPDATE
Patient received from EMS. Patient intubated and ventilated. 7.0 tube, 23 at the lip. VC//20/5/80%. Tube secure and patent. Will continue to monitor

## 2023-06-04 NOTE — CARE UPDATE
EMR review requested by ED, history obtained by review as patient is intubated and not able to participate.     83F who presents as transfer from OSH for extensive ICH/right frontotemporal parietal parenchymal hematoma with   mass effect and midline shift who was reported to be GCS of 3 on arrival to OSH. In ED, sedation paused with no change of neurologic status and no plans for cerebral decompression/intervention. ECG obtained in house with sinus tachycardia and diffuse ST elevations with out reciprocal depressions. Reported outside hospital with elevated troponin's.    Given active ICH and GCS of 3, no plans for cardiac intervention. Agree with GOC/comfort care discussions.    Cardiology will sign off.    Zaida De Santiago, PGY6  Cardiovascular Disease  [facility]

## 2023-06-05 LAB — BACTERIA UR CULT: NO GROWTH

## 2023-06-09 LAB
BACTERIA BLD CULT: NORMAL
BACTERIA BLD CULT: NORMAL